# Patient Record
(demographics unavailable — no encounter records)

---

## 2025-07-11 NOTE — REASON FOR VISIT
[FreeTextEntry2] : 07/11/2025: 89-year-old female here today for: new injury, b/l hand/wrist pain. She states she has trigger fingers that are coming back, carpel tunnel and Dupuytren.

## 2025-07-11 NOTE — PHYSICAL EXAM
[de-identified] : R hand:  Tender volar wrist  Good finger ROM  +Tinels  +Phalens  +Compression test  Decreased sensation median nerve distribution Dupuytren's cord   L hand:  Tender volar wrist  Good finger ROM  +Tinels  +Phalens  +Compression test  Decreased sensation median nerve distribution Dupuytren's cord

## 2025-07-11 NOTE — HISTORY OF PRESENT ILLNESS
[5] : 5 [Dull/Aching] : dull/aching [de-identified] : Bilateral numbness and tingling Braces are not helping   Bilateral hand nodules Has h/o Dupuytren's  [FreeTextEntry1] : hands, wrists

## 2025-07-11 NOTE — ASSESSMENT
[FreeTextEntry1] :   Carpal tunnel syndrome is a progressive problem that once occurs will be a chronic issue that will likely continue until surgical treatment is necessary. Carpal tunnel may or may not be symptomatic. Treatment options for carpal tunnel include OTC NSAIDS, prescription NSAIDS, ice, bracing, OT, cortisone injection, and surgical release.   I recommend the patient take over the counter medication such as Advil/Motrin/Aleve or Tylenol for pain and inflammation  EMG  Return after EMG   The patient was advised of the diagnosis. The natural history of the pathology was explained in full to the patient in layman's terms. All questions were answered. The risks and benefits of surgical and non-surgical treatment alternatives were explained in full to the patient. The patient understands that this is a chronic progressive condition that will most likely worsen over time without an intervention such as Xiaflex or surgery.   I recommend observation  I recommend the patient take over the counter medication such as Advil/Motrin/Alleve or Tylenol for pain and inflammation related to Dupuytrens